# Patient Record
Sex: MALE | URBAN - METROPOLITAN AREA
[De-identification: names, ages, dates, MRNs, and addresses within clinical notes are randomized per-mention and may not be internally consistent; named-entity substitution may affect disease eponyms.]

---

## 2024-01-16 PROBLEM — Z00.00 ENCOUNTER FOR PREVENTIVE HEALTH EXAMINATION: Status: ACTIVE | Noted: 2024-01-16

## 2024-02-14 ENCOUNTER — EMERGENCY (EMERGENCY)
Facility: HOSPITAL | Age: 39
LOS: 0 days | Discharge: HOME | End: 2024-02-15
Attending: STUDENT IN AN ORGANIZED HEALTH CARE EDUCATION/TRAINING PROGRAM
Payer: OTHER MISCELLANEOUS

## 2024-02-14 VITALS
TEMPERATURE: 98 F | WEIGHT: 199.96 LBS | DIASTOLIC BLOOD PRESSURE: 88 MMHG | HEART RATE: 76 BPM | RESPIRATION RATE: 18 BRPM | HEIGHT: 68 IN | SYSTOLIC BLOOD PRESSURE: 129 MMHG | OXYGEN SATURATION: 97 %

## 2024-02-14 DIAGNOSIS — M54.2 CERVICALGIA: ICD-10-CM

## 2024-02-14 PROCEDURE — 99284 EMERGENCY DEPT VISIT MOD MDM: CPT

## 2024-02-14 PROCEDURE — 99053 MED SERV 10PM-8AM 24 HR FAC: CPT
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
## 2024-02-15 RX ORDER — IBUPROFEN 200 MG
1 TABLET ORAL
Qty: 15 | Refills: 0
Start: 2024-02-15 | End: 2024-02-19

## 2024-02-15 RX ORDER — METHOCARBAMOL 500 MG/1
750 TABLET, FILM COATED ORAL ONCE
Refills: 0 | Status: COMPLETED | OUTPATIENT
Start: 2024-02-15 | End: 2024-02-15

## 2024-02-15 RX ORDER — IBUPROFEN 200 MG
600 TABLET ORAL ONCE
Refills: 0 | Status: COMPLETED | OUTPATIENT
Start: 2024-02-15 | End: 2024-02-15

## 2024-02-15 RX ORDER — LIDOCAINE 4 G/100G
1 CREAM TOPICAL ONCE
Refills: 0 | Status: COMPLETED | OUTPATIENT
Start: 2024-02-15 | End: 2024-02-15

## 2024-02-15 RX ORDER — METHOCARBAMOL 500 MG/1
1 TABLET, FILM COATED ORAL
Qty: 15 | Refills: 0
Start: 2024-02-15 | End: 2024-02-19

## 2024-02-15 RX ADMIN — LIDOCAINE 1 PATCH: 4 CREAM TOPICAL at 02:34

## 2024-02-15 RX ADMIN — METHOCARBAMOL 750 MILLIGRAM(S): 500 TABLET, FILM COATED ORAL at 02:34

## 2024-02-15 RX ADMIN — Medication 600 MILLIGRAM(S): at 02:34
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
## 2024-02-15 NOTE — ED ADULT NURSE NOTE - ED CARDIAC CAPILLARY REFILL
OPERATIVE REPORT    DATE:  8/15/2019    ATTENDING PHYSICIAN:   Alex Mcneil MD    SURGEON:   Alex Mcneil MD    ASSISTANT:   TAYLOR Calvo    ANESTHESIA:   General    PREOPERATIVE DIAGNOSIS:   Recurrent ventral hernia    POSTOPERATIVE DIAGNOSIS:   Same    OPERATION:   Ventral herniorrhaphy    SPECIMEN:  None    PROCEDURE:  With the patient in the supine position after induction of general anesthesia, the abdomen was prepped and draped in the normal sterile fashion. A 6 cm longitudinal incision was made directly overlying the incarcerated, nonreducible subcutaneous mass. The incision was extended to subcutaneous mass. The hernia sac was encountered. The hernia sac was opened. There was incarcerated omentum present.  Adhesions between the omentum and the fascial margin were transected. The omentum was reduced. The hernia sac was resected. The fascial defect measured approximately 3 cm in diameter.  A finger was inserted in to peritoneal cavity and there were no other fascial defects present. A large Bard Ventralex was used to perform the herniorrhaphy with the prosthetic material in an underlay position. The prosthetic material was inserted into the peritoneal cavity. The polypropylene straps were pulled. Care was taken to ensure there was no intra-abdominal viscera caught between the prosthetic material and the anterior abdominal wall. The polypropylene straps were secured to the fascial margin using 0 Neurolon. The surgical field was irrigated and meticulously checked for hemostasis. 10 cc of 0.5% Marcaine with Epinephrine was injected for local anesthesia and postoperative pain control. The incision was closed in layers. The subcutaneous tissue was reapproximated with interrupted 3-0 Monocryl.  The skin incision was closed with running intradermal 4-0 Monocryl. Steri-Strips were applied. A sterile dressing was applied. The estimated blood loss of the operation was negligible and there were no drains placed.  Sponge and needle count were correct x 2.  The patient tolerated the procedure well without complications.    Alex Mcneil MD     2 seconds or less
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
## 2024-02-15 NOTE — ED PROVIDER NOTE - PATIENT PORTAL LINK FT
You can access the FollowMyHealth Patient Portal offered by [facility] by registering at the following website: [email or web address]. By joining [facility]’s FollowMyHealth portal, you will also be able to view your health information using other applications (apps) compatible with our system.
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
## 2024-02-23 ENCOUNTER — APPOINTMENT (OUTPATIENT)
Dept: UROLOGY | Facility: CLINIC | Age: 39
End: 2024-02-23
Payer: COMMERCIAL

## 2024-02-23 VITALS
DIASTOLIC BLOOD PRESSURE: 80 MMHG | SYSTOLIC BLOOD PRESSURE: 135 MMHG | RESPIRATION RATE: 16 BRPM | HEIGHT: 68 IN | BODY MASS INDEX: 30.31 KG/M2 | TEMPERATURE: 97.2 F | HEART RATE: 81 BPM | WEIGHT: 200 LBS

## 2024-02-23 DIAGNOSIS — Z30.09 ENCOUNTER FOR OTHER GENERAL COUNSELING AND ADVICE ON CONTRACEPTION: ICD-10-CM

## 2024-02-23 DIAGNOSIS — Z78.9 OTHER SPECIFIED HEALTH STATUS: ICD-10-CM

## 2024-02-23 PROCEDURE — 99203 OFFICE O/P NEW LOW 30 MIN: CPT

## 2024-02-23 NOTE — HISTORY OF PRESENT ILLNESS
[FreeTextEntry1] : Patient is a 37 yo M who presents for vasectomy.  He has two girls, aged 16 and 6. He and his wife have discussed and he is interested in vasectomy.  His wife has IUD but does not want to continue using.  Denies scrotal pain, urologic complaints.

## 2024-02-23 NOTE — PHYSICAL EXAM
[Normal Appearance] : normal appearance [Well Groomed] : well groomed [General Appearance - In No Acute Distress] : no acute distress [Edema] : no peripheral edema [Respiration, Rhythm And Depth] : normal respiratory rhythm and effort [Abdomen Soft] : soft [Exaggerated Use Of Accessory Muscles For Inspiration] : no accessory muscle use [Abdomen Tenderness] : non-tender [Urinary Bladder Findings] : the bladder was normal on palpation [Costovertebral Angle Tenderness] : no ~M costovertebral angle tenderness [Testes Tenderness] : no tenderness of the testes [Testes Mass (___cm)] : there were no testicular masses [Normal Station and Gait] : the gait and station were normal for the patient's age [] : no rash [No Focal Deficits] : no focal deficits [Oriented To Time, Place, And Person] : oriented to person, place, and time [Mood] : the mood was normal [Affect] : the affect was normal [No Palpable Adenopathy] : no palpable adenopathy [de-identified] : Chaperone for exam was offered but declined by pt.  B/l palpable vas

## 2024-02-23 NOTE — ASSESSMENT
[FreeTextEntry1] : Patient is a 37 yo M who presents for vasectomy.  -Discussed with pt that vasectomy is permanent sterilization procedure -D/w pt that vasectomy is reversible it depends a number of factors for success and should not be an expectation postoperatively, and also often not covered by insurance -he is aware and both he and partner have discussed vasectomy for past several mos/yrs and are in agreement -d/w pt that he cannot engage in unprotected intercourse without risk of pregnancy until confirmation of sterility with semen analysis, likely will require 20 ejaculations and/or >3 months -d/w he cannot engage in sports/exercise/sexual activity for at least 1 wk after vasectomy -d/w pt that I do not perform no scalpel vasectomy, and that I use scalpel -d/w pt risks/benefits/alternatives of vasectomy, including female contraception -d/w pt risks of chronic orchalgia, infection, bleeding/hematoma, injury to surrounding structure -[place] sterilization consent signed, he will schedule at his convenience

## 2024-07-12 ENCOUNTER — APPOINTMENT (OUTPATIENT)
Dept: UROLOGY | Facility: CLINIC | Age: 39
End: 2024-07-12

## 2024-08-30 ENCOUNTER — APPOINTMENT (OUTPATIENT)
Dept: UROLOGY | Facility: CLINIC | Age: 39
End: 2024-08-30
Payer: COMMERCIAL

## 2024-08-30 ENCOUNTER — OUTPATIENT (OUTPATIENT)
Dept: OUTPATIENT SERVICES | Facility: HOSPITAL | Age: 39
LOS: 1 days | End: 2024-08-30
Payer: COMMERCIAL

## 2024-08-30 VITALS
DIASTOLIC BLOOD PRESSURE: 97 MMHG | HEART RATE: 76 BPM | SYSTOLIC BLOOD PRESSURE: 145 MMHG | WEIGHT: 200 LBS | RESPIRATION RATE: 17 BRPM | BODY MASS INDEX: 30.31 KG/M2 | HEIGHT: 68 IN

## 2024-08-30 DIAGNOSIS — R35.0 FREQUENCY OF MICTURITION: ICD-10-CM

## 2024-08-30 DIAGNOSIS — Z30.2 ENCOUNTER FOR STERILIZATION: ICD-10-CM

## 2024-08-30 PROCEDURE — 55250 REMOVAL OF SPERM DUCT(S): CPT

## 2024-09-03 DIAGNOSIS — Z30.2 ENCOUNTER FOR STERILIZATION: ICD-10-CM

## 2024-09-04 ENCOUNTER — APPOINTMENT (OUTPATIENT)
Age: 39
End: 2024-09-04

## 2024-09-04 LAB — CORE LAB BIOPSY: NORMAL
